# Patient Record
Sex: MALE | Race: OTHER | HISPANIC OR LATINO | URBAN - METROPOLITAN AREA
[De-identification: names, ages, dates, MRNs, and addresses within clinical notes are randomized per-mention and may not be internally consistent; named-entity substitution may affect disease eponyms.]

---

## 2024-05-28 ENCOUNTER — EMERGENCY (EMERGENCY)
Facility: HOSPITAL | Age: 34
LOS: 0 days | Discharge: HOME | End: 2024-05-28
Attending: EMERGENCY MEDICINE
Payer: COMMERCIAL

## 2024-05-28 VITALS
DIASTOLIC BLOOD PRESSURE: 89 MMHG | RESPIRATION RATE: 16 BRPM | OXYGEN SATURATION: 99 % | TEMPERATURE: 97 F | SYSTOLIC BLOOD PRESSURE: 134 MMHG | HEART RATE: 89 BPM | WEIGHT: 160.06 LBS

## 2024-05-28 VITALS
TEMPERATURE: 97 F | OXYGEN SATURATION: 100 % | SYSTOLIC BLOOD PRESSURE: 128 MMHG | RESPIRATION RATE: 18 BRPM | DIASTOLIC BLOOD PRESSURE: 74 MMHG | HEART RATE: 78 BPM
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
## 2024-05-28 DIAGNOSIS — R42 DIZZINESS AND GIDDINESS: ICD-10-CM

## 2024-05-28 DIAGNOSIS — M54.59 OTHER LOW BACK PAIN: ICD-10-CM

## 2024-05-28 DIAGNOSIS — M25.512 PAIN IN LEFT SHOULDER: ICD-10-CM

## 2024-05-28 DIAGNOSIS — Z87.891 PERSONAL HISTORY OF NICOTINE DEPENDENCE: ICD-10-CM
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
## 2024-05-28 PROCEDURE — 70450 CT HEAD/BRAIN W/O DYE: CPT | Mod: MC

## 2024-05-28 PROCEDURE — 71045 X-RAY EXAM CHEST 1 VIEW: CPT

## 2024-05-28 PROCEDURE — 99285 EMERGENCY DEPT VISIT HI MDM: CPT

## 2024-05-28 PROCEDURE — 99284 EMERGENCY DEPT VISIT MOD MDM: CPT | Mod: 25

## 2024-05-28 PROCEDURE — 70450 CT HEAD/BRAIN W/O DYE: CPT | Mod: 26,MC

## 2024-05-28 PROCEDURE — 73030 X-RAY EXAM OF SHOULDER: CPT | Mod: 26,LT

## 2024-05-28 PROCEDURE — 71045 X-RAY EXAM CHEST 1 VIEW: CPT | Mod: 26

## 2024-05-28 PROCEDURE — 73030 X-RAY EXAM OF SHOULDER: CPT | Mod: LT

## 2024-05-28 RX ORDER — METHOCARBAMOL 500 MG/1
1500 TABLET, FILM COATED ORAL ONCE
Refills: 0 | Status: COMPLETED | OUTPATIENT
Start: 2024-05-28 | End: 2024-05-28

## 2024-05-28 RX ORDER — IBUPROFEN 200 MG
1 TABLET ORAL
Qty: 20 | Refills: 0
Start: 2024-05-28 | End: 2024-06-01

## 2024-05-28 RX ORDER — IBUPROFEN 200 MG
600 TABLET ORAL ONCE
Refills: 0 | Status: COMPLETED | OUTPATIENT
Start: 2024-05-28 | End: 2024-05-28

## 2024-05-28 RX ORDER — ACETAMINOPHEN 500 MG
650 TABLET ORAL ONCE
Refills: 0 | Status: COMPLETED | OUTPATIENT
Start: 2024-05-28 | End: 2024-05-28

## 2024-05-28 RX ORDER — METHOCARBAMOL 500 MG/1
2 TABLET, FILM COATED ORAL
Qty: 20 | Refills: 0
Start: 2024-05-28 | End: 2024-06-01

## 2024-05-28 RX ADMIN — METHOCARBAMOL 1500 MILLIGRAM(S): 500 TABLET, FILM COATED ORAL at 09:00

## 2024-05-28 RX ADMIN — Medication 650 MILLIGRAM(S): at 09:00

## 2024-05-28 RX ADMIN — Medication 600 MILLIGRAM(S): at 09:00

## 2024-05-28 NOTE — ED ADULT NURSE NOTE - NSFALLUNIVINTERV_ED_ALL_ED
Bed/Stretcher in lowest position, wheels locked, appropriate side rails in place/Call bell, personal items and telephone in reach/Instruct patient to call for assistance before getting out of bed/chair/stretcher/Non-slip footwear applied when patient is off stretcher/[place] to call system/Physically safe environment - no spills, clutter or unnecessary equipment/Purposeful proactive rounding/Room/bathroom lighting operational, light cord in reach

## 2024-05-28 NOTE — ED PROVIDER NOTE - PATIENT PORTAL LINK FT
You can access the FollowMyHealth Patient Portal offered by [facility] by registering at the following website: [email or web address]. By joining [employer]’s FollowMyHealth portal, you will also be able to view your health information using other applications (apps) compatible with our system.

## 2024-05-28 NOTE — ED PROVIDER NOTE - NSTIMEPROVIDERCAREINITIATE_GEN_ER
28-May-2024 08:06
[FreeTextEntry1] : \par General: In no apparent distress. \par \par HEENT:  Atraumatic.  Extraocular muscles intact.  \par \par Cardiovascular: Regular rate and rhythm, normal S1/S2 \par \par Pulmonary: Clear to auscultation bilaterally.  No wheezing. \par \par Abdomen: soft, gravid, nontender, nondistended, no rebound, no guarding \par \par Extremities: no calf tenderness or edema \par \par Neurology: +2 reflexes in bilateral upper extremities \par \par Psychiatry:  Happy mood.  Awake, alert, oriented to person, place, time. 
[FreeTextEntry1] : \par General: In no apparent distress. \par \par HEENT:  Atraumatic.  Extraocular muscles intact.  \par \par Cardiovascular: Regular rate and rhythm, normal S1/S2 \par \par Pulmonary: Clear to auscultation bilaterally.  No wheezing. \par \par Abdomen: soft, gravid, nontender, nondistended, no rebound, no guarding \par \par Extremities: no calf tenderness or edema \par \par Neurology: +2 reflexes in bilateral upper extremities \par \par Psychiatry:  Happy mood.  Awake, alert, oriented to person, place, time.
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
## 2024-05-28 NOTE — ED PROVIDER NOTE - NSFOLLOWUPCLINICS_GEN_ALL_ED_FT
[facility]  Medicine  [address], [place]   Phone: [phone]  Fax:   Follow Up Time: 7-10 Days

## 2024-05-28 NOTE — ED PROVIDER NOTE - PHYSICAL EXAMINATION
CONSTITUTIONAL: NAD  SKIN: Warm dry  MSK: no midline spinal ttp c/t/l  HEAD: NCAT  EYES: NL inspection  ENT: MMM  NECK: Supple; non tender.  CARD: RRR  RESP: CTAB  ABD: S/NT no R/G  EXT: no pedal edema  NEURO: Grossly unremarkable, cn 2-12 grossly intact, gait nl, sensation and motor strength bl UE / LE intact  PSYCH: Cooperative, appropriate.

## 2024-05-28 NOTE — ED PROVIDER NOTE - PROGRESS NOTE DETAILS
ss Reviewed imaging with ptJuany lind. Patient to be discharged from ED. Any available test results were discussed with patient and/or family. Verbal instructions given, including instructions to return to ED immediately for any new, worsening, or concerning symptoms. Patient endorsed understanding. Written discharge instructions additionally given, including follow-up plan.

## 2024-05-28 NOTE — ED PROVIDER NOTE - NSFOLLOWUPINSTRUCTIONS_ED_ALL_ED_FT
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
## 2024-08-19 ENCOUNTER — APPOINTMENT (OUTPATIENT)
Dept: GASTROENTEROLOGY | Facility: CLINIC | Age: 34
End: 2024-08-19
Payer: MEDICAID

## 2024-08-19 VITALS
BODY MASS INDEX: 31.76 KG/M2 | SYSTOLIC BLOOD PRESSURE: 129 MMHG | HEIGHT: 64 IN | HEART RATE: 62 BPM | WEIGHT: 186 LBS | DIASTOLIC BLOOD PRESSURE: 82 MMHG

## 2024-08-19 DIAGNOSIS — Z80.0 FAMILY HISTORY OF MALIGNANT NEOPLASM OF DIGESTIVE ORGANS: ICD-10-CM

## 2024-08-19 DIAGNOSIS — K62.5 HEMORRHAGE OF ANUS AND RECTUM: ICD-10-CM

## 2024-08-19 DIAGNOSIS — Z78.9 OTHER SPECIFIED HEALTH STATUS: ICD-10-CM

## 2024-08-19 DIAGNOSIS — K21.9 GASTRO-ESOPHAGEAL REFLUX DISEASE W/OUT ESOPHAGITIS: ICD-10-CM

## 2024-08-19 DIAGNOSIS — Z00.00 ENCOUNTER FOR GENERAL ADULT MEDICAL EXAMINATION W/OUT ABNORMAL FINDINGS: ICD-10-CM

## 2024-08-19 DIAGNOSIS — Z80.6 FAMILY HISTORY OF LEUKEMIA: ICD-10-CM

## 2024-08-19 PROCEDURE — 99204 OFFICE O/P NEW MOD 45 MIN: CPT

## 2024-08-19 RX ORDER — SODIUM SULFATE, POTASSIUM SULFATE AND MAGNESIUM SULFATE 1.6; 3.13; 17.5 G/177ML; G/177ML; G/177ML
17.5-3.13-1.6 SOLUTION ORAL
Qty: 1 | Refills: 0 | Status: ACTIVE | COMMUNITY
Start: 2024-08-19 | End: 1900-01-01
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
## 2024-08-19 NOTE — HISTORY OF PRESENT ILLNESS
[FreeTextEntry1] : 35yo M increased risk for CRC (cousin), with no significant medical history, presents today for rectal bleeding.  Patient states he has had rectal bleeding for 3 months. States he has blood in toilet, on tissue and streaked in stool. Patient states he has a bowel movement 3-4 times a week and has bloody bowel movements about 2 times a week, accompanied with abdominal cramping. States he does not strain when having a BM.  Patient states he has heartburn about everyday worsening in severity due to diet. Patient denies nausea or vomiting, no dysphagia or odynophagia, no heartburn, no unintentional weight loss or appetite changes, no diarrhea, no constipation  Never had EGD/Colonoscopy  No recent labs available for review

## 2024-08-19 NOTE — PHYSICAL EXAM

## 2024-08-19 NOTE — END OF VISIT
[] : Fellow [FreeTextEntry3] :  Pt seen/examined, agree with above findings and plan as outlined by Anne Perry NP. Recommend obtain labs and schedule EGD/colonoscopy.

## 2024-08-20 LAB
ALBUMIN SERPL ELPH-MCNC: 4.1 G/DL
ALP BLD-CCNC: 141 U/L
ALT SERPL-CCNC: 44 U/L
ANION GAP SERPL CALC-SCNC: 10 MMOL/L
AST SERPL-CCNC: 28 U/L
BASOPHILS # BLD AUTO: 0.06 K/UL
BASOPHILS NFR BLD AUTO: 0.8 %
BILIRUB SERPL-MCNC: 0.2 MG/DL
BUN SERPL-MCNC: 10 MG/DL
CALCIUM SERPL-MCNC: 9.4 MG/DL
CHLORIDE SERPL-SCNC: 104 MMOL/L
CO2 SERPL-SCNC: 25 MMOL/L
CREAT SERPL-MCNC: 0.9 MG/DL
EGFR: 114 ML/MIN/1.73M2
EOSINOPHIL # BLD AUTO: 0.23 K/UL
EOSINOPHIL NFR BLD AUTO: 3.2 %
GLUCOSE SERPL-MCNC: 83 MG/DL
HCT VFR BLD CALC: 42.8 %
HGB BLD-MCNC: 14.3 G/DL
IMM GRANULOCYTES NFR BLD AUTO: 0.8 %
INR PPP: 0.91 RATIO
LYMPHOCYTES # BLD AUTO: 2.19 K/UL
LYMPHOCYTES NFR BLD AUTO: 30.5 %
MAN DIFF?: NORMAL
MCHC RBC-ENTMCNC: 25.6 PG
MCHC RBC-ENTMCNC: 33.4 G/DL
MCV RBC AUTO: 76.6 FL
MONOCYTES # BLD AUTO: 0.79 K/UL
MONOCYTES NFR BLD AUTO: 11 %
NEUTROPHILS # BLD AUTO: 3.84 K/UL
NEUTROPHILS NFR BLD AUTO: 53.7 %
PLATELET # BLD AUTO: 336 K/UL
PMV BLD AUTO: 0 /100 WBCS
POTASSIUM SERPL-SCNC: 4.6 MMOL/L
PROT SERPL-MCNC: 7 G/DL
PT BLD: 10.4 SEC
RBC # BLD: 5.59 M/UL
RBC # FLD: 14.1 %
SODIUM SERPL-SCNC: 139 MMOL/L
WBC # FLD AUTO: 7.17 K/UL

## 2024-10-28 ENCOUNTER — EMERGENCY (EMERGENCY)
Facility: HOSPITAL | Age: 34
LOS: 0 days | Discharge: HOME | End: 2024-10-28
Attending: EMERGENCY MEDICINE
Payer: MEDICAID

## 2024-10-28 VITALS
HEART RATE: 81 BPM | SYSTOLIC BLOOD PRESSURE: 110 MMHG | WEIGHT: 149.91 LBS | DIASTOLIC BLOOD PRESSURE: 73 MMHG | TEMPERATURE: 98 F | RESPIRATION RATE: 17 BRPM | OXYGEN SATURATION: 98 %
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
## 2024-10-28 DIAGNOSIS — M25.561 PAIN IN RIGHT KNEE: ICD-10-CM
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
## 2024-10-28 PROCEDURE — 99284 EMERGENCY DEPT VISIT MOD MDM: CPT

## 2024-10-28 PROCEDURE — 73564 X-RAY EXAM KNEE 4 OR MORE: CPT | Mod: 26,RT

## 2024-10-28 PROCEDURE — 73564 X-RAY EXAM KNEE 4 OR MORE: CPT | Mod: RT

## 2024-10-28 PROCEDURE — 99283 EMERGENCY DEPT VISIT LOW MDM: CPT | Mod: 25

## 2024-10-28 RX ADMIN — Medication 600 MILLIGRAM(S): at 17:09

## 2024-10-28 NOTE — ED ADULT NURSE NOTE - NSSEPSISSUSPECTED_ED_A_ED
Activity Intolerance/Impaired Mobility     Mobility/activity is maintained at optimum level for patient Progressing        Communication Impairment     Ability to express needs and understand communication [address] Discharge to home or other facility with appropriate resources Progressing        DISCHARGE PLANNING - CARE MANAGEMENT     Discharge to post-acute care or home with appropriate resources Progressing        HEMATOLOGIC - ADULT     Maintains hematologic stability Progressing        INFECTION - ADULT     Absence or prevention of progression during hospitalization Progressing        Knowledge Deficit     Patient/family/caregiver demonstrates understanding of disease process, treatment plan, medications, and discharge instructions Progressing        METABOLIC, FLUID AND ELECTROLYTES - ADULT     Electrolytes maintained within normal limits Progressing     Fluid balance maintained Progressing     Glucose maintained within target range Progressing        Neurological Deficit     Neurological status is stable or improving Progressing        NEUROSENSORY - ADULT     Achieves stable or improved neurological status Progressing     Absence of seizures Progressing     Remains free of injury related to seizures activity Progressing     Achieves maximal functionality and self care Progressing        Nutrition     Nutrition/Hydration status is improving Progressing        Nutrition/Hydration-ADULT     Nutrient/Hydration intake appropriate for improving, restoring or maintaining nutritional needs Progressing        PAIN - ADULT     Verbalizes/displays adequate comfort level or baseline comfort level Progressing        Potential for Aspiration     Non-ventilated patient's risk of aspiration is minimized Progressing        Potential for Falls     Patient will remain free of falls Progressing        SAFETY ADULT     Patient will remain free of falls Progressing     Maintain or return to baseline ADL function Progressing     Maintain or return mobility status to optimal level Progressing        SKIN/TISSUE INTEGRITY - ADULT     Skin integrity remains intact Progressing     Incision(s), wounds(s) or drain site(s) healing without S/S of infection Progressing     Oral mucous membranes remain intact Progressing No
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
## 2024-10-28 NOTE — ED PROVIDER NOTE - ATTENDING CONTRIBUTION TO CARE
I have personally performed a history and physical exam on this patient and personally directed the management of the patient. Patient is a 34-year-old male presents to the emergency department for evaluation of right-sided knee pain moderate throbbing in nature after climbing off of a truck denies any other symptoms at this time    On physical exam patient is normocephalic atraumatic pupils equal round reactive light accommodation extraocular muscles intact oropharynx clear chest clear station bilaterally patient has right knee pain mild swelling full range of motion patient is able to ambulate with pain capillary refills normal pedal pulses 2+ negative anterior posterior drawer sign patient does have medial pain with varus valgus stress maneuvers    Assessment plan patient presents for evaluation of knee pain we obtained x-rays per my independent evaluation axis with fracture or dislocation patient has medial pain with varus valgus stress given the signs and symptoms placed in knee immobilizer follow-up to Ortho in next 24 to 48 hours we discussed RICE therapy will discharge at this time follow-up to PMD next 24 to 48 hours I have personally performed a history and physical exam on this patient and personally directed the management of the patient. Patient is a 34-year-old male presents to the emergency department for evaluation of right-sided knee pain moderate throbbing in nature after climbing off of a truck denies any other symptoms at this time    On physical exam patient is normocephalic atraumatic pupils equal round reactive light accommodation extraocular muscles intact oropharynx clear chest clear station bilaterally patient has right knee pain mild swelling full range of motion patient is able to ambulate with pain capillary refills normal pedal pulses 2+ negative anterior posterior drawer sign patient does have medial pain with varus valgus stress maneuvers    Assessment plan patient presents for evaluation of knee pain we obtained x-rays per my independent evaluation axis with fracture or dislocation patient has medial pain with varus valgus stress given the signs and symptoms placed in knee immobilizer follow-up to Ortho in next 24 to 48 hours we discussed RICE therapy will discharge at this time follow-up to PMD next 24 to 48 hours.

## 2024-10-28 NOTE — ED PROVIDER NOTE - NSFOLLOWUPINSTRUCTIONS_ED_ALL_ED_FT
Our Emergency Department Referral Coordinators will be reaching out to you in the next 24-48 hours from 9:00am to 5:00pm with a follow up appointment. Please expect a phone call from the hospital in that time frame. If you do not receive a call or if you have any questions or concerns, you can reach them at   [phone]    Knee Pain    Knee pain is a very common symptom and can have many causes. Knee pain often goes away when you follow your health care provider's instructions for relieving pain and discomfort at home. However, knee pain can develop into a condition that needs treatment. Some conditions may include:     Arthritis caused by wear and tear (osteoarthritis).  Arthritis caused by swelling and irritation (rheumatoid arthritis or gout).  A cyst or growth in your knee.  An infection in your knee joint.  An injury that will not heal.  Damage, swelling, or irritation of the tissues that support your knee (torn ligaments or tendinitis).    If your knee pain continues, additional tests may be ordered to diagnose your condition. Tests may include X-rays or other imaging studies of your knee. You may also need to have fluid removed from your knee. Treatment for ongoing knee pain depends on the cause, but treatment may include:    Medicines to relieve pain or swelling.  Steroid injections in your knee.  Physical therapy.  Surgery.    HOME CARE INSTRUCTIONS  Take medicines only as directed by your health care provider.   Rest your knee and keep it raised (elevated) while you are resting.  Do not do things that cause or worsen pain.  Avoid high-impact activities or exercises, such as running, jumping rope, or doing jumping jacks.  Apply ice to the knee area:  Put ice in a plastic bag.  Place a towel between your skin and the bag.  Leave the ice on for 20 minutes, 2–3 times a day.  Ask your health care provider if you should wear an elastic knee support.  Keep a pillow under your knee when you sleep.  Lose weight if you are overweight. Extra weight can put pressure on your knee.  Do not use any tobacco products, including cigarettes, chewing tobacco, or electronic cigarettes. If you need help quitting, ask your health care provider. Smoking may slow the healing of any bone and joint problems that you may have.    SEEK MEDICAL CARE IF:  Your knee pain continues, changes, or gets worse.  You have a fever along with knee pain.  Your knee evie or locks up.  Your knee becomes more swollen.    SEEK IMMEDIATE MEDICAL CARE IF:  Your knee joint feels hot to the touch.  You have chest pain or trouble breathing.

## 2024-10-28 NOTE — ED PROVIDER NOTE - PATIENT PORTAL LINK FT
You can access the FollowMyHealth Patient Portal offered by [facility] by registering at the following website: [email or web address]. By joining [employer]’s FollowMyHealth portal, you will also be able to view your health information using other applications (apps) compatible with our system.

## 2024-10-28 NOTE — ED PROVIDER NOTE - PHYSICAL EXAMINATION
VITAL SIGNS: I have reviewed nursing notes and confirm.  CONSTITUTIONAL: Well-appearing, non-toxic, in NAD  SKIN: Warm dry, normal skin turgor  HEAD: NCAT  EYES: No conjunctival injection, scleral anicteric  ENT: Moist mucous membranes, normal pharynx with no erythema or exudates  NECK: Supple; full ROM. Nontender. No cervical LAD  CARD: RRR, no murmurs, rubs or gallops  RESP: Clear to ausculation bilaterally.  No rales, rhonchi, or wheezing.  ABD: Soft, non-distended, non-tender, no rebound or guarding. No CVA tenderness  EXT: Full ROM, right knee minor swelling and bony tenderness VITAL SIGNS: I have reviewed nursing notes and confirm.  CONSTITUTIONAL: Well-appearing, non-toxic, in NAD  SKIN: Warm dry, normal skin turgor  HEAD: NCAT  EYES: No conjunctival injection, scleral anicteric  ENT: Moist mucous membranes, normal pharynx with no erythema or exudates  NECK: Supple; full ROM. Nontender. No cervical LAD  CARD: RRR, no murmurs, rubs or gallops  RESP: Clear to ausculation bilaterally.  No rales, rhonchi, or wheezing.  ABD: Soft, non-distended, non-tender, no rebound or guarding. No CVA tenderness  EXT: Full ROM, right knee minor swelling and medial bony tenderness

## 2024-10-28 NOTE — ED PROVIDER NOTE - OBJECTIVE STATEMENT
Patient is a 34 year old male with no significant PMH presenting for knee pain. Patient reports a sudden onset of sharp right medial knee pain after getting out of his truck while at work. Patient denies falling to the ground or additional trauma.

## 2024-11-11 ENCOUNTER — APPOINTMENT (OUTPATIENT)
Dept: GASTROENTEROLOGY | Facility: CLINIC | Age: 34
End: 2024-11-11

## 2024-11-19 PROBLEM — Z78.9 OTHER SPECIFIED HEALTH STATUS: Chronic | Status: ACTIVE | Noted: 2024-10-28

## 2024-11-21 ENCOUNTER — TRANSCRIPTION ENCOUNTER (OUTPATIENT)
Age: 34
End: 2024-11-21

## 2024-11-21 ENCOUNTER — OUTPATIENT (OUTPATIENT)
Dept: OUTPATIENT SERVICES | Facility: HOSPITAL | Age: 34
LOS: 1 days | Discharge: HOME | End: 2024-11-21
Payer: MEDICAID

## 2024-11-21 ENCOUNTER — RESULT REVIEW (OUTPATIENT)
Age: 34
End: 2024-11-21

## 2024-11-21 VITALS — RESPIRATION RATE: 17 BRPM | WEIGHT: 177.91 LBS | HEIGHT: 66 IN

## 2024-11-21 VITALS — DIASTOLIC BLOOD PRESSURE: 63 MMHG | SYSTOLIC BLOOD PRESSURE: 107 MMHG | RESPIRATION RATE: 16 BRPM | HEART RATE: 67 BPM

## 2024-11-21 DIAGNOSIS — K29.50 UNSPECIFIED CHRONIC GASTRITIS WITHOUT BLEEDING: ICD-10-CM

## 2024-11-21 DIAGNOSIS — K20.80 OTHER ESOPHAGITIS WITHOUT BLEEDING: ICD-10-CM

## 2024-11-21 DIAGNOSIS — K62.5 HEMORRHAGE OF ANUS AND RECTUM: ICD-10-CM

## 2024-11-21 DIAGNOSIS — K20.90 ESOPHAGITIS, UNSPECIFIED WITHOUT BLEEDING: ICD-10-CM

## 2024-11-21 DIAGNOSIS — K29.80 DUODENITIS WITHOUT BLEEDING: ICD-10-CM

## 2024-11-21 DIAGNOSIS — K44.9 DIAPHRAGMATIC HERNIA WITHOUT OBSTRUCTION OR GANGRENE: ICD-10-CM

## 2024-11-21 DIAGNOSIS — K21.9 GASTRO-ESOPHAGEAL REFLUX DISEASE WITHOUT ESOPHAGITIS: ICD-10-CM

## 2024-11-21 PROCEDURE — 43239 EGD BIOPSY SINGLE/MULTIPLE: CPT

## 2024-11-21 PROCEDURE — 88305 TISSUE EXAM BY PATHOLOGIST: CPT | Mod: 26

## 2024-11-21 PROCEDURE — 88305 TISSUE EXAM BY PATHOLOGIST: CPT

## 2024-11-21 PROCEDURE — 88312 SPECIAL STAINS GROUP 1: CPT | Mod: 26

## 2024-11-21 PROCEDURE — 45378 DIAGNOSTIC COLONOSCOPY: CPT

## 2024-11-21 PROCEDURE — 88312 SPECIAL STAINS GROUP 1: CPT

## 2024-11-21 RX ORDER — PANTOPRAZOLE 40 MG/1
40 TABLET, DELAYED RELEASE ORAL TWICE DAILY
Qty: 60 | Refills: 3 | Status: ACTIVE | COMMUNITY
Start: 2024-11-21 | End: 1900-01-01

## 2024-11-21 NOTE — CHART NOTE - NSCHARTNOTEFT_GEN_A_CORE
PACU ANESTHESIA ADMISSION NOTE      Procedure:   Post op diagnosis:      ____  Intubated  TV:______       Rate: ______      FiO2: ______    _x___  Patent Airway    _x___  Full return of protective reflexes    _x___  Full recovery from anesthesia / back to baseline status    Vitals:  T(C): 36.4 (11-21-24 @ 09:43), Max: 36.4 (11-21-24 @ 09:43)  HR: 68 (11-21-24 @ 10:13) (64 - 75)  BP: 106/64 (11-21-24 @ 10:13) (102/55 - 107/63)  RR: 15 (11-21-24 @ 10:13) (14 - 18)  SpO2: 95% (11-21-24 @ 10:13) (94% - 98%)    Mental Status:  _x___ Awake   _____ Alert   _____ Drowsy   _____ Sedated    Nausea/Vomiting:  _x___  NO       ______Yes,   See Post - Op Orders         Pain Scale (0-10):  __0___    Treatment: _x___ None    ____ See Post - Op/PCA Orders    Post - Operative Fluids:   __x__ Oral   ____ See Post - Op Orders    Plan: Discharge:   _x___Home       _____Floor     _____Critical Care    _____  Other:_________________    Comments:  No anesthesia issues or complications noted.  Discharge when criteria met.

## 2024-11-21 NOTE — ASU DISCHARGE PLAN (ADULT/PEDIATRIC) - CARE PROVIDER_API CALL
Karen Corral  Gastroenterology  [address] [place] [address]  Phone: [phone]  Fax: [phone]  Follow Up Time:

## 2024-11-21 NOTE — ASU PATIENT PROFILE, ADULT - FALL HARM RISK - UNIVERSAL INTERVENTIONS
Bed in lowest position, wheels locked, appropriate side rails in place/Call bell, personal items and telephone in reach/Instruct patient to call for assistance before getting out of bed or chair/Non-slip footwear when patient is out of bed/[place] to call system/Physically safe environment - no spills, clutter or unnecessary equipment/Purposeful Proactive Rounding/Room/bathroom lighting operational, light cord in reach

## 2024-11-21 NOTE — ASU DISCHARGE PLAN (ADULT/PEDIATRIC) - FINANCIAL ASSISTANCE
[facility] provides services at a reduced cost to those who are determined to be eligible through [facility]’s financial assistance program. Information regarding [facility]’s financial assistance program can be found by going to [email or web address] or by calling [phone].

## 2024-11-21 NOTE — CHART NOTE - NSCHARTNOTEFT_GEN_A_CORE
PACU ANESTHESIA ADMISSION NOTE      Procedure:   Post op diagnosis:      ____  Intubated  TV:______       Rate: ______      FiO2: ______    _x___  Patent Airway    _x___  Full return of protective reflexes    _x___  Full recovery from anesthesia / back to baseline status    Vitals:  T(C): 36.4 (11-21-24 @ 09:43), Max: 36.4 (11-21-24 @ 09:43)  HR: 67 (11-21-24 @ 10:28) (64 - 75)  BP: 107/63 (11-21-24 @ 10:28) (102/55 - 107/63)  RR: 16 (11-21-24 @ 10:28) (14 - 18)  SpO2: 95% (11-21-24 @ 10:13) (94% - 98%)    Mental Status:  _x___ Awake   _____ Alert   _____ Drowsy   _____ Sedated    Nausea/Vomiting:  _x___  NO       ______Yes,   See Post - Op Orders         Pain Scale (0-10):  __0___    Treatment: _x___ None    ____ See Post - Op/PCA Orders    Post - Operative Fluids:   __x__ Oral   ____ See Post - Op Orders    Plan: Discharge:   _x___Home       _____Floor     _____Critical Care    _____  Other:_________________    Comments:  No anesthesia issues or complications noted.  Discharge when criteria met.

## 2024-11-21 NOTE — ASU DISCHARGE PLAN (ADULT/PEDIATRIC) - NS MD DC FALL RISK RISK
For information on Fall & Injury Prevention, visit: [email or web address] OR  [email or web address] OR  https://www.cdc.gov/steadi/patient.html

## 2024-11-22 LAB — SURGICAL PATHOLOGY STUDY: SIGNIFICANT CHANGE UP

## 2025-01-30 ENCOUNTER — TRANSCRIPTION ENCOUNTER (OUTPATIENT)
Age: 35
End: 2025-01-30

## 2025-01-30 ENCOUNTER — RESULT REVIEW (OUTPATIENT)
Age: 35
End: 2025-01-30

## 2025-01-30 ENCOUNTER — OUTPATIENT (OUTPATIENT)
Dept: OUTPATIENT SERVICES | Facility: HOSPITAL | Age: 35
LOS: 1 days | Discharge: HOME | End: 2025-01-30
Payer: MEDICAID

## 2025-01-30 VITALS
HEART RATE: 93 BPM | DIASTOLIC BLOOD PRESSURE: 74 MMHG | RESPIRATION RATE: 18 BRPM | HEIGHT: 66 IN | SYSTOLIC BLOOD PRESSURE: 119 MMHG | WEIGHT: 179.9 LBS | TEMPERATURE: 98 F | OXYGEN SATURATION: 96 %

## 2025-01-30 VITALS
HEART RATE: 64 BPM | SYSTOLIC BLOOD PRESSURE: 118 MMHG | RESPIRATION RATE: 18 BRPM | DIASTOLIC BLOOD PRESSURE: 73 MMHG | OXYGEN SATURATION: 97 % | TEMPERATURE: 98 F

## 2025-01-30 DIAGNOSIS — K20.90 ESOPHAGITIS, UNSPECIFIED WITHOUT BLEEDING: ICD-10-CM

## 2025-01-30 DIAGNOSIS — K21.9 GASTRO-ESOPHAGEAL REFLUX DISEASE WITHOUT ESOPHAGITIS: ICD-10-CM

## 2025-01-30 PROCEDURE — 45378 DIAGNOSTIC COLONOSCOPY: CPT

## 2025-01-30 PROCEDURE — 88305 TISSUE EXAM BY PATHOLOGIST: CPT

## 2025-01-30 PROCEDURE — 88305 TISSUE EXAM BY PATHOLOGIST: CPT | Mod: 26

## 2025-01-30 PROCEDURE — 43239 EGD BIOPSY SINGLE/MULTIPLE: CPT

## 2025-01-30 NOTE — ASU DISCHARGE PLAN (ADULT/PEDIATRIC) - FINANCIAL ASSISTANCE
Hospitalist at bedside     [facility] provides services at a reduced cost to those who are determined to be eligible through [facility]’s financial assistance program. Information regarding [facility]’s financial assistance program can be found by going to [email or web address] or by calling [phone].

## 2025-01-30 NOTE — ASU PREOP CHECKLIST - STERILIZATION AFFIRMATION
n/a [No falls in past year] : Patient reported no falls in the past year [Independent] : feeding [Some assistance needed] : using transportation [Full assistance needed] : managing finances

## 2025-02-03 LAB — SURGICAL PATHOLOGY STUDY: SIGNIFICANT CHANGE UP

## 2025-02-06 DIAGNOSIS — K62.5 HEMORRHAGE OF ANUS AND RECTUM: ICD-10-CM

## 2025-02-06 DIAGNOSIS — K64.8 OTHER HEMORRHOIDS: ICD-10-CM

## 2025-02-06 DIAGNOSIS — K44.9 DIAPHRAGMATIC HERNIA WITHOUT OBSTRUCTION OR GANGRENE: ICD-10-CM

## 2025-02-06 DIAGNOSIS — K21.9 GASTRO-ESOPHAGEAL REFLUX DISEASE WITHOUT ESOPHAGITIS: ICD-10-CM

## 2025-02-06 DIAGNOSIS — K64.4 RESIDUAL HEMORRHOIDAL SKIN TAGS: ICD-10-CM

## 2025-02-06 DIAGNOSIS — K21.00 GASTRO-ESOPHAGEAL REFLUX DISEASE WITH ESOPHAGITIS, WITHOUT BLEEDING: ICD-10-CM

## 2025-02-14 ENCOUNTER — APPOINTMENT (OUTPATIENT)
Dept: GASTROENTEROLOGY | Facility: CLINIC | Age: 35
End: 2025-02-14
Payer: MEDICAID

## 2025-02-14 DIAGNOSIS — R79.89 OTHER SPECIFIED ABNORMAL FINDINGS OF BLOOD CHEMISTRY: ICD-10-CM

## 2025-02-14 DIAGNOSIS — K20.80 OTHER ESOPHAGITIS WITHOUT BLEEDING: ICD-10-CM

## 2025-02-14 PROCEDURE — 99214 OFFICE O/P EST MOD 30 MIN: CPT | Mod: 93

## 2025-04-09 NOTE — PACU DISCHARGE NOTE - NAUSEA/VOMITING:
None Detail Level: Detailed Quality 47: Advance Care Plan: Advance Care Planning discussed and documented; advance care plan or surrogate decision maker documented in the medical record. Quality 226: Preventive Care And Screening: Tobacco Use: Screening And Cessation Intervention: Patient screened for tobacco use and is an ex/non-smoker

## 2025-05-30 NOTE — ASU PREOP CHECKLIST - WEIGHT IN KG
80.7 Assistance OOB with selected safe patient handling equipment if applicable/Assistance with ambulation/Communicate risk of Fall with Harm to all staff, patient, and family/Provide visual cue: red socks, yellow wristband, yellow gown, etc/Reinforce activity limits and safety measures with patient and family/Bed in lowest position, wheels locked, appropriate side rails in place/Call bell, personal items and telephone in reach/Instruct patient to call for assistance before getting out of bed/chair/stretcher/Non-slip footwear applied when patient is off stretcher/[place] to call system/Physically safe environment - no spills, clutter or unnecessary equipment/Purposeful Proactive Rounding/Room/bathroom lighting operational, light cord in reach

## 2025-06-06 ENCOUNTER — APPOINTMENT (OUTPATIENT)
Dept: GASTROENTEROLOGY | Facility: CLINIC | Age: 35
End: 2025-06-06
